# Patient Record
Sex: MALE | Race: BLACK OR AFRICAN AMERICAN | NOT HISPANIC OR LATINO | URBAN - METROPOLITAN AREA
[De-identification: names, ages, dates, MRNs, and addresses within clinical notes are randomized per-mention and may not be internally consistent; named-entity substitution may affect disease eponyms.]

---

## 2021-09-16 ENCOUNTER — OFFICE VISIT (OUTPATIENT)
Dept: URBAN - METROPOLITAN AREA CLINIC 118 | Facility: CLINIC | Age: 51
End: 2021-09-16

## 2021-11-18 ENCOUNTER — OFFICE VISIT (OUTPATIENT)
Dept: URBAN - METROPOLITAN AREA CLINIC 118 | Facility: CLINIC | Age: 51
End: 2021-11-18

## 2022-05-10 ENCOUNTER — OFFICE VISIT (OUTPATIENT)
Dept: URBAN - METROPOLITAN AREA CLINIC 118 | Facility: CLINIC | Age: 52
End: 2022-05-10
Payer: COMMERCIAL

## 2022-05-10 ENCOUNTER — LAB OUTSIDE AN ENCOUNTER (OUTPATIENT)
Dept: URBAN - METROPOLITAN AREA CLINIC 118 | Facility: CLINIC | Age: 52
End: 2022-05-10

## 2022-05-10 DIAGNOSIS — Z12.11 SCREEN FOR COLON CANCER: ICD-10-CM

## 2022-05-10 DIAGNOSIS — K62.5 RECTAL BLEEDING: ICD-10-CM

## 2022-05-10 DIAGNOSIS — K91.2 SHORT GUT SYNDROME: ICD-10-CM

## 2022-05-10 PROCEDURE — 99204 OFFICE O/P NEW MOD 45 MIN: CPT | Performed by: INTERNAL MEDICINE
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
## 2022-05-27 ENCOUNTER — OFFICE VISIT (OUTPATIENT)
Dept: URBAN - METROPOLITAN AREA SURGERY CENTER 23 | Facility: SURGERY CENTER | Age: 52
End: 2022-05-27
Payer: COMMERCIAL

## 2022-05-27 DIAGNOSIS — Z12.11 COLON CANCER SCREENING: ICD-10-CM

## 2022-05-27 DIAGNOSIS — K63.89 BACTERIAL OVERGROWTH SYNDROME: ICD-10-CM

## 2022-05-27 PROCEDURE — G8907 PT DOC NO EVENTS ON DISCHARG: HCPCS | Performed by: INTERNAL MEDICINE

## 2022-05-27 PROCEDURE — 45380 COLONOSCOPY AND BIOPSY: CPT | Performed by: INTERNAL MEDICINE

## 2022-09-08 ENCOUNTER — OFFICE VISIT (OUTPATIENT)
Dept: URBAN - METROPOLITAN AREA CLINIC 118 | Facility: CLINIC | Age: 52
End: 2022-09-08
Payer: COMMERCIAL

## 2022-09-08 VITALS
SYSTOLIC BLOOD PRESSURE: 126 MMHG | WEIGHT: 315 LBS | DIASTOLIC BLOOD PRESSURE: 83 MMHG | HEIGHT: 72 IN | TEMPERATURE: 97.7 F | HEART RATE: 94 BPM | BODY MASS INDEX: 42.66 KG/M2

## 2022-09-08 DIAGNOSIS — K62.89 RECTAL PAIN: ICD-10-CM

## 2022-09-08 DIAGNOSIS — K62.5 RECTAL BLEEDING: ICD-10-CM

## 2022-09-08 PROCEDURE — 99214 OFFICE O/P EST MOD 30 MIN: CPT | Performed by: INTERNAL MEDICINE

## 2022-09-08 NOTE — PHYSICAL EXAM CHEST:
no lesions,  no deformities,  no traumatic injuries,  no significant scars are present, no masses present, breathing is unlabored without accessory muscle use

## 2022-09-08 NOTE — PHYSICAL EXAM GASTROINTESTINAL
Abdomen , soft, nontender, nondistended , no guarding or rigidity , no masses palpable , normal bowel sounds , Liver and Spleen , no hepatosplenomegaly present, liver nontender Rectal Chaperone Stephanie present. Appears to be an anal fissure in the posterior midline of the rectum. Also noted to have internal hemorrhoids.
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
## 2022-09-13 ENCOUNTER — TELEPHONE ENCOUNTER (OUTPATIENT)
Dept: URBAN - METROPOLITAN AREA CLINIC 118 | Facility: CLINIC | Age: 52
End: 2022-09-13

## 2022-10-11 ENCOUNTER — DASHBOARD ENCOUNTERS (OUTPATIENT)
Age: 52
End: 2022-10-11

## 2022-10-11 ENCOUNTER — OFFICE VISIT (OUTPATIENT)
Dept: URBAN - METROPOLITAN AREA CLINIC 118 | Facility: CLINIC | Age: 52
End: 2022-10-11
Payer: COMMERCIAL

## 2022-10-11 VITALS
BODY MASS INDEX: 42.66 KG/M2 | TEMPERATURE: 97.9 F | WEIGHT: 315 LBS | HEART RATE: 81 BPM | HEIGHT: 72 IN | DIASTOLIC BLOOD PRESSURE: 82 MMHG | SYSTOLIC BLOOD PRESSURE: 126 MMHG

## 2022-10-11 DIAGNOSIS — K91.2 SHORT GUT SYNDROME: ICD-10-CM

## 2022-10-11 DIAGNOSIS — K62.5 RECTAL BLEEDING: ICD-10-CM

## 2022-10-11 DIAGNOSIS — K62.89 RECTAL PAIN: ICD-10-CM

## 2022-10-11 DIAGNOSIS — L03.317 CELLULITIS OF BUTTOCK: ICD-10-CM

## 2022-10-11 PROBLEM — 77880009: Status: ACTIVE | Noted: 2022-10-11

## 2022-10-11 PROBLEM — 26629001: Status: ACTIVE | Noted: 2022-05-10

## 2022-10-11 PROBLEM — 12063002: Status: ACTIVE | Noted: 2022-10-11

## 2022-10-11 PROCEDURE — 99214 OFFICE O/P EST MOD 30 MIN: CPT | Performed by: INTERNAL MEDICINE

## 2022-10-11 RX ORDER — CEPHALEXIN 500 MG/1
1 CAPSULE CAPSULE ORAL
Qty: 20 | Refills: 0 | OUTPATIENT
Start: 2022-10-11 | End: 2022-10-16

## 2022-10-11 NOTE — PHYSICAL EXAM GASTROINTESTINAL
Abdomen , soft, nontender, nondistended , no guarding or rigidity , no masses palpable , normal bowel sounds , Liver and Spleen , no hepatosplenomegaly present, liver nontender Rectal  external exam shows stool around the rectum, a small anal fissure in the posterior midline, a 1 inch linear tear along the gluteal cleft. Skin on buttock and surrounding thigh is red, not indurated or fluctuant. No abscess seen.

## 2022-10-11 NOTE — HPI-TODAY'S VISIT:
Mr. Blackman is a 51 y/o AAM who presents today for f/u on rectal pain/bleeding.  He was previously seen a month ago for the same and reports persistent symptoms. He went to the ED since then and was diagnosed with candidiasis in the rectal area and anal fissure. He was started on Lotrisone cream.  Since that time, he has been using the Lotrisone and Nifedepine cream daily with minimal relief. He reports constant pain in the rectal area, and extreme pain when having a BM. He also reports pain with touch and during bathing. He reports bright red blood after having a BM.  He denies fever, abdominal pain or changes in bowel habits. Currently having a formed BM several times/day.  Last colonoscopy was 5/27/22 showed some erythema in the sigmoid colon (bx negative) and internal hemorrhoids.

## 2022-11-03 ENCOUNTER — OFFICE VISIT (OUTPATIENT)
Dept: URBAN - METROPOLITAN AREA CLINIC 118 | Facility: CLINIC | Age: 52
End: 2022-11-03

## 2022-11-17 ENCOUNTER — OFFICE VISIT (OUTPATIENT)
Dept: URBAN - METROPOLITAN AREA CLINIC 118 | Facility: CLINIC | Age: 52
End: 2022-11-17

## 2023-01-31 ENCOUNTER — OFFICE VISIT (OUTPATIENT)
Dept: URBAN - METROPOLITAN AREA CLINIC 96 | Facility: CLINIC | Age: 53
End: 2023-01-31

## 2024-07-31 ENCOUNTER — OFFICE VISIT (OUTPATIENT)
Dept: URBAN - METROPOLITAN AREA CLINIC 118 | Facility: CLINIC | Age: 54
End: 2024-07-31

## 2024-07-31 VITALS
HEART RATE: 111 BPM | TEMPERATURE: 97.5 F | SYSTOLIC BLOOD PRESSURE: 121 MMHG | WEIGHT: 291 LBS | HEIGHT: 72 IN | BODY MASS INDEX: 39.42 KG/M2 | DIASTOLIC BLOOD PRESSURE: 80 MMHG

## 2024-07-31 PROBLEM — 249515001: Status: ACTIVE | Noted: 2024-07-31

## 2024-07-31 RX ORDER — CHOLESTYRAMINE 4 G/9G
1 SCOOP POWDER, FOR SUSPENSION ORAL ONCE A DAY
Qty: 30 | Refills: 3 | OUTPATIENT
Start: 2024-07-31

## 2024-07-31 NOTE — HPI-TODAY'S VISIT:
7/31/24 - 55 yo BM here with complaints of fecal incontinence and soiling with urgency going on since 1999.  Occurs daily, in AM, between 6 and 10AM.  BMs ~2x/d, soft, formed.  Notes perianal burning with wiping, and is using Calmoseptine.  Notes BRB on TP qod.  NO weight loss. Pt has seen Colorectal surgery, and had fissurectomy in 10/2022.  Due to recurrent bleeding, he had a colonoscopy at [facility] by Colorectal Surgery in 2023.  Was advised he had internal hemorrhoids, and was told surgery was not a good idea. -------------- 10/11/22 - Mr. Blackman is a 53 y/o AAM who presents today for f/u on rectal pain/bleeding.  He was previously seen a month ago for the same and reports persistent symptoms. He went to the ED since then and was diagnosed with candidiasis in the rectal area and anal fissure. He was started on Lotrisone cream.  Since that time, he has been using the Lotrisone and Nifedepine cream daily with minimal relief. He reports constant pain in the rectal area, and extreme pain when having a BM. He also reports pain with touch and during bathing. He reports bright red blood after having a BM.  He denies fever, abdominal pain or changes in bowel habits. Currently having a formed BM several times/day.  Last colonoscopy was 5/27/22 showed some erythema in the sigmoid colon (bx negative) and internal hemorrhoids.

## 2024-07-31 NOTE — PHYSICAL EXAM GASTROINTESTINAL
Abdomen , soft, nontender, nondistended , no guarding or rigidity , no masses palpable , normal bowel sounds , Liver and Spleen , no hepatomegaly present , no hepatosplenomegaly , liver nontender , spleen not palpable Well-healed vertical midline incision

## 2024-12-16 ENCOUNTER — OFFICE VISIT (OUTPATIENT)
Dept: URBAN - METROPOLITAN AREA CLINIC 118 | Facility: CLINIC | Age: 54
End: 2024-12-16

## 2025-01-14 ENCOUNTER — OFFICE VISIT (OUTPATIENT)
Dept: URBAN - METROPOLITAN AREA CLINIC 118 | Facility: CLINIC | Age: 55
End: 2025-01-14
Payer: COMMERCIAL

## 2025-01-14 ENCOUNTER — WEB ENCOUNTER (OUTPATIENT)
Dept: URBAN - METROPOLITAN AREA CLINIC 6 | Facility: CLINIC | Age: 55
End: 2025-01-14

## 2025-01-14 ENCOUNTER — LAB OUTSIDE AN ENCOUNTER (OUTPATIENT)
Dept: URBAN - METROPOLITAN AREA CLINIC 118 | Facility: CLINIC | Age: 55
End: 2025-01-14

## 2025-01-14 VITALS
WEIGHT: 272 LBS | HEIGHT: 72 IN | BODY MASS INDEX: 36.84 KG/M2 | DIASTOLIC BLOOD PRESSURE: 73 MMHG | TEMPERATURE: 97.3 F | SYSTOLIC BLOOD PRESSURE: 112 MMHG | HEART RATE: 96 BPM

## 2025-01-14 DIAGNOSIS — R15.9 INCONTINENCE OF FECES, UNSPECIFIED FECAL INCONTINENCE TYPE: ICD-10-CM

## 2025-01-14 PROBLEM — 72042002: Status: ACTIVE | Noted: 2025-01-14

## 2025-01-14 PROCEDURE — 99204 OFFICE O/P NEW MOD 45 MIN: CPT | Performed by: STUDENT IN AN ORGANIZED HEALTH CARE EDUCATION/TRAINING PROGRAM

## 2025-01-14 RX ORDER — CHOLESTYRAMINE 4 G/9G
1 SCOOP POWDER, FOR SUSPENSION ORAL ONCE A DAY
Qty: 30 | Refills: 3 | Status: ACTIVE | COMMUNITY
Start: 2024-07-31

## 2025-01-14 NOTE — PHYSICAL EXAM GASTROINTESTINAL
soft, nontender, nondistended, no guarding or rigidity, no masses palpable, normal bowel sounds, no hepatosplenomegaly, no rebound tenderness

## 2025-01-14 NOTE — HPI-TODAY'S VISIT:
The patient is a 54-year-old male with history of anal fissurectomy, hemorrhoids, and fecal incontinence who presents for follow-up.  The patient was last seen in clinic on 7/31/2024.  At the patient's last visit, cholestyramine was recommended for fecal incontinence.  He complained of intermittent rectal bleeding at that time.  He states today that he continues to have incontinence and intermittent rectal bleeding since his hysterectomy in 2022.  He has seen multiple GI doctors without any improvement in his symptoms despite the consultation.  The patient is very frustrated by this and seeks additional guidance today.

## 2025-02-17 ENCOUNTER — OFFICE VISIT (OUTPATIENT)
Dept: URBAN - METROPOLITAN AREA MEDICAL CENTER 28 | Facility: MEDICAL CENTER | Age: 55
End: 2025-02-17
Payer: COMMERCIAL

## 2025-02-17 DIAGNOSIS — R15.9 FECAL INCONTINENCE: ICD-10-CM

## 2025-02-17 PROCEDURE — 91122 ANAL PRESSURE RECORD: CPT | Performed by: INTERNAL MEDICINE

## 2025-02-17 PROCEDURE — 91120 RECTAL SENSATION TEST: CPT | Performed by: INTERNAL MEDICINE

## 2025-02-24 ENCOUNTER — WEB ENCOUNTER (OUTPATIENT)
Dept: URBAN - METROPOLITAN AREA CLINIC 118 | Facility: CLINIC | Age: 55
End: 2025-02-24

## 2025-02-24 ENCOUNTER — TELEPHONE ENCOUNTER (OUTPATIENT)
Dept: URBAN - METROPOLITAN AREA CLINIC 92 | Facility: CLINIC | Age: 55
End: 2025-02-24